# Patient Record
Sex: MALE | Race: WHITE | Employment: FULL TIME
[De-identification: names, ages, dates, MRNs, and addresses within clinical notes are randomized per-mention and may not be internally consistent; named-entity substitution may affect disease eponyms.]

---

## 2023-09-28 ENCOUNTER — NURSE TRIAGE (OUTPATIENT)
Dept: OTHER | Facility: CLINIC | Age: 38
End: 2023-09-28

## 2023-09-28 ENCOUNTER — OFFICE VISIT (OUTPATIENT)
Age: 38
End: 2023-09-28

## 2023-09-28 VITALS
RESPIRATION RATE: 18 BRPM | TEMPERATURE: 98 F | SYSTOLIC BLOOD PRESSURE: 131 MMHG | BODY MASS INDEX: 22.67 KG/M2 | WEIGHT: 167.4 LBS | HEIGHT: 72 IN | DIASTOLIC BLOOD PRESSURE: 78 MMHG | HEART RATE: 53 BPM | OXYGEN SATURATION: 96 %

## 2023-09-28 DIAGNOSIS — M54.50 PAIN IN LEFT LUMBAR REGION OF BACK: Primary | ICD-10-CM

## 2023-09-28 RX ORDER — IBUPROFEN 600 MG/1
600 TABLET ORAL 3 TIMES DAILY PRN
Qty: 30 TABLET | Refills: 0 | Status: SHIPPED | OUTPATIENT
Start: 2023-09-28

## 2023-09-28 RX ORDER — CYCLOBENZAPRINE HCL 10 MG
10 TABLET ORAL 3 TIMES DAILY PRN
Qty: 30 TABLET | Refills: 0 | Status: SHIPPED | OUTPATIENT
Start: 2023-09-28 | End: 2023-10-08

## 2023-09-28 NOTE — TELEPHONE ENCOUNTER
[place]    Received call from [place] at [facility]; Patient with Red Flag Complaint requesting to establish care with [facility]. Subjective: Caller states \"severe back pain. Back pain for a year. Recent numbness and tingling in left leg\"     Current Symptoms: Back pain in the lower back in the middle  Off and on for about a year. Seen PCP and was given muscle relaxer. Now unable to see PCP due to insurance. Currently having bad back pain, unable to go to work today. NO urinary issues  Will get numbness and tingling intermittently down left leg. Will feel niru a pinched nerve    Onset: 1 year ago; intermittent, worsening    Associated Symptoms: NA    Pain Severity: 8/10    Temperature:      What has been tried: ibuprofen will ease up pain    LMP: NA Pregnant: Unknown    Recommended disposition: See in Office Today    Care advice provided, patient verbalizes understanding; denies any other questions or concerns; instructed to call back for any new or worsening symptoms. Patient/Caller agrees with recommended disposition; writer provided warm transfer to [facility] at [facility] for appointment scheduling    Attention Provider: Thank you for allowing me to participate in the care of your patient. The patient was connected to triage in response to information provided to the E[facility]. Please do not respond through this encounter as the response is not directed to a shared pool.     Reason for Disposition   SEVERE back pain (e.g., excruciating, unable to do any normal activities) and not improved after pain medicine and CARE ADVICE    Protocols used: Back Pain-ADULT-[place]

## 2024-05-17 ENCOUNTER — OFFICE VISIT (OUTPATIENT)
Age: 39
End: 2024-05-17

## 2024-05-17 VITALS
OXYGEN SATURATION: 97 % | RESPIRATION RATE: 18 BRPM | SYSTOLIC BLOOD PRESSURE: 132 MMHG | DIASTOLIC BLOOD PRESSURE: 86 MMHG | BODY MASS INDEX: 22.66 KG/M2 | WEIGHT: 171 LBS | HEART RATE: 54 BPM | TEMPERATURE: 98.1 F | HEIGHT: 73 IN

## 2024-05-17 DIAGNOSIS — M54.50 PAIN IN LEFT LUMBAR REGION OF BACK: Primary | ICD-10-CM

## 2024-05-17 RX ORDER — CYCLOBENZAPRINE HCL 10 MG
10 TABLET ORAL 3 TIMES DAILY PRN
Qty: 21 TABLET | Refills: 0 | Status: SHIPPED | OUTPATIENT
Start: 2024-05-17 | End: 2024-05-27

## 2024-05-17 ASSESSMENT — ENCOUNTER SYMPTOMS
COUGH: 0
CONSTIPATION: 0
SHORTNESS OF BREATH: 0
ABDOMINAL PAIN: 0

## 2024-05-17 NOTE — PATIENT INSTRUCTIONS
Recommend Physical therapy to build core and abdominal strength and rehab lower back issues. Call PT and make an appt. If not I'mproving, see Orthopedist for more extensive evaluation.     I have discussed any testing results, diagnosis and treatment plan. If symptoms worsen please present to your local ED for urgent matters. Otherwise, please follow up with your PCP. Thank you for seeing us today at [facility]. I hope you feel better soon.

## 2024-05-17 NOTE — PROGRESS NOTES
Christiane Forde (:  1985) is a 38 y.o. male,Established patient, here for evaluation of the following chief complaint(s):  Lower Back Pain (Bilateral low back pain with left side worst than right. Pain has been going on for 2 yrs after loading a treadmill onto a pickup truck. Pt tried a chiropractor w/o any relief. Advil provides relief but the pain comes back. Pt states the pain is tolerable, but he if active in sports and after games his back hurts worst.  He states he feels it is muscular. Pt is hoping to receive a referral for physical therapy today. )        Assessment    1. Pain in left lumbar region of back  -     cyclobenzaprine (FLEXERIL) 10 MG tablet; Take 1 tablet by mouth 3 times daily as needed for Muscle spasms, Disp-21 tablet, R-0Normal  -     [facility] ([facility]), [place]     Plan    Assessment reveals pain is more left sided and has some definite muscle spasm present. No pain with straight leg raise and no radiation.  We have agreed that he will take a short course of Flexeril again.  He will meet with a physical therapist as he never followed through on PT previously.  We discussed that he needs to build core strength abdominal muscles to  help take load off back and then develop a preventative plan. If not improving should proceed to Orthopedic consult and further imaging. Discussed all with patient who agrees with plan.     I have discussed the results of my assessment, diagnosis and treatment plan with the patient. The patient also understands that early in the process of an illness, an Urgent Care work-up can be falsely reassuring. Therefore, if symptoms change, worsen or do not resolve and remain persistent, they should return to Urgent Care or seek further evaluation in the ED for immediate assessment. Additionally, they should continue care with their Primary provider. No further questions remained and patient was discharged to home.

## 2024-06-07 ENCOUNTER — HOSPITAL ENCOUNTER (OUTPATIENT)
Facility: HOSPITAL | Age: 39
Setting detail: RECURRING SERIES
Discharge: HOME | End: 2024-06-10
Payer: COMMERCIAL

## 2024-06-07 PROCEDURE — 97110 THERAPEUTIC EXERCISES: CPT | Performed by: PHYSICAL THERAPIST

## 2024-06-07 PROCEDURE — 97162 PT EVAL MOD COMPLEX 30 MIN: CPT | Performed by: PHYSICAL THERAPIST

## 2024-06-07 NOTE — THERAPY EVALUATION
;Presentation:  MEDIUM Complexity : Evolving with changing characteristics  ;Clinical Decision Making:  MEDIUM Complexity : FOTO score of 26-74 Overall Complexity Rating: MEDIUM  Problem List: pain affecting function, decrease ROM, decrease strength, impaired gait/balance, decrease ADL/functional abilities, decrease activity tolerance, decrease flexibility/joint mobility, and decrease transfer abilities    Treatment Plan may include any combination of the followin Therapeutic Exercise, 78233 Neuromuscular Re-Education, 93299 Manual Therapy, 32707 Therapeutic Activity, 74005 Self Care/Home Management, 19845 Electrical Stim unattended, 93181 Electrical Stim attended, and (Elective Self Pay) Needle Insertion w/o Injection (1 or 2 muscles), (3+ muscles)  Patient / Family readiness to learn indicated by: asking questions, trying to perform skills, and interest  Persons(s) to be included in education: patient (P)  Barriers to Learning/Limitations: none  Measures taken if barriers to learning present: n/a  Patient Self Reported Health Status: good  Rehabilitation Potential: good    Short Term Goals: To be accomplished in 2 treatments:                         1.) The patient will be independent with their HEP consistently (at least 3-7 days) for at least one week in order to eventually transition to a more advanced self maintenance program.  Long Term Goals: To be accomplished in 24 treatments:                         1.) The patient will have at most 4/10 pain in order to better tolerate daily activities, such as but not limited to bathing/getting dressed, ambulation, turning/twisting, lifting/carrying,   squatting, stair negotiation, etc.                         2.) The patient will be able to walk his dog after work for at least 15 min without having to change positions due to pain.                         3.) The patient will improve their FOTO score from 68 to at least 72 to show improvements in functional